# Patient Record
Sex: MALE | Race: WHITE | Employment: UNEMPLOYED | URBAN - METROPOLITAN AREA
[De-identification: names, ages, dates, MRNs, and addresses within clinical notes are randomized per-mention and may not be internally consistent; named-entity substitution may affect disease eponyms.]

---

## 2018-05-01 ENCOUNTER — HOSPITAL ENCOUNTER (OUTPATIENT)
Dept: NUTRITION | Age: 33
Discharge: HOME | End: 2018-05-01
Payer: MEDICARE

## 2018-05-01 PROCEDURE — 97802 MEDICAL NUTRITION INDIV IN: CPT

## 2018-05-01 NOTE — PROGRESS NOTES
[address] [place] [address] 51, 45 [facility] [place], 70 [facility]  Phone: [phone]  Fax: [phone]   Nutrition Assessment  Medical Nutrition Therapy   Outpatient Initial Evaluation         Patient Name: Tequila Medrano : 1985   Treatment Diagnosis: obesity   Referral Source: Kaylee Mclaughlin MD [facility]): 2018     Gender: male Age: 33 y.o. Ht: 62 In Wt: 193.6 lb  kg   BMI: 35.4 BMR   Male 26 BMR Female      Past Medical History includes: Hypothyroid; trisomy 24     Pertinent Medications:     Synthroid; Biochemical Data:   No results found for: HBA1C, HGBE8, KXG5GLPR, [id number]  No results found for: CHOL, CHOLPOCT, CHOLX, CHLST, CHOLV, HDL, HDLPOC, LDL, LDLCPOC, LDLC, DLDLP, VLDLC, VLDL, TGLX, TRIGL, TRIGP, TGLPOCT, CHHD, CHHDX  No results found for: GPT, ALT, SGOT, GGT, GGTP, AP, APIT, APX, CBIL, TBIL, TBILI  No results found for: TOYA, CREAPOC, ACREA, CREA, REFC3, REFC4  No results found for: BUN, BUNPOC, IBUN, MBUNV, BUNV  No results found for: MCACR, MCA1, MCA2, MCA3, MCAU, MCAU2, MCALPOCT     Subjective/Assessment:   Pt's care giver stated pt has gained 10 lbs over the past year and possibly 20 lbs over the past 4 years. She associates his most recent weight gain with extra snacking, especially at work, due to access to a vending machine. Pt works 9-3:30 x 5d/week. Pt's care giver reported pt's parents have limited and restricted pt's allowance each week, but pt has resorted to stealing money to purchase snacks. Pt lives in a group home and does not typically eat the meals prepared for the residents. Pt's care giver stated she takes him to the grocery store once per week. Current Eating Patterns: Pt is a very picky eater. He prefers high carbohydrate and high fat foods. Pt typically eats 3 meals per day, but snacks in between meals and before bed uncontrollably.   Pt refuses to eat most vegetables and his diet is limited in lean sources of protein. Pt drinks water or diet juice with meals, but will purchase regular soda from the vending machine at work. Estimate Needs   Calories:  1700 Protein: 128 Carbs: 170 Fat: 57   Kcal/day  g/day  g/day  g/day        percent: 30  40  30               Education & Recommendations provided:  Educated pt on macronutrient composition of various foods and provided specific recommendations for intake at each scheduled meal and snack. Also discussed the importance of establishing a consistent lifestyle and eating schedule to promote a more efficient metabolism. Handouts Provided: []  Carbohydrates  []  Protein  []  Fiber  []  Serving Sizes  [x]  Meal and Snack Ideas  []  Food Journals []  Diabetes  []  Cholesterol  []  Sodium  [x]  Meal Builder  []  Diet Plan  []  Others:   Information Reviewed with: Pt and pt's care giver, Dai Esparza   Readiness to Change Stage: [x]  Pre-contemplative    []  Contemplative  []  Preparation               []  Action                  []  Maintenance   Potential Barriers to Learning: []  Decline in memory    []  Language barrier   []  Other:  []  Emotional                  []  Limited mobility  [x]  Lack of motivation     [x] Vision, hearing or cognitive impairment   Expected Compliance: / Stacy Footman. .. [employer]ucer depending on the consistency of meal/snack planning and preparation on the part of his care givers. Nutritional Goal - To promote lifestyle changes to result in:    [x]  Weight loss  []  Improved diabetic control  []  Decreased cholesterol levels  []  Decreased blood pressure  []  Weight maintenance []  Preventing any interactions associated with food allergies  []  Adequate weight gain toward goal weight  []  Other:        Patient Goals:  SMART goals 1. Always combine and balance carbohydrate and protein        2. Eat 2-3 hrs after snacks and 4-5 hrs after meals  3.  Avoid eating after dinner; OR limit to protein choices only     Dietitian Signature: Niecy Saenz MS, RD, CSSD Date: 5/1/2018   Follow-up: Wed Jun 13 at 1:00 Time: 12:53 PM

## 2018-06-12 ENCOUNTER — HOSPITAL ENCOUNTER (OUTPATIENT)
Dept: NUTRITION | Age: 33
Discharge: HOME | End: 2018-06-12
Payer: MEDICARE

## 2018-06-12 PROCEDURE — 97803 MED NUTRITION INDIV SUBSEQ: CPT

## 2018-06-12 NOTE — PROGRESS NOTES
NUTRITION  FOLLOW-UP TREATMENT NOTE  Patient Name: Ximena Christensen         Date: 2018  : 1985    YES Patient  Verified  Diagnosis: obesity   In time:   100             Out time:   130   Total Treatment Time (min):   30     SUBJECTIVE/ASSESSMENT    Changes in medication or medical history? Any new allergies, surgeries or procedures?    /NO    If yes, update Summary List           Current Wt: 191.6 Previous Wt: 193.6 Wt Change: -2     Achievement of Goals: Pt's caregiver, Josie Gitelman, reported pt had lost 10 lbs after 1 month of following the recommended meal plan. Pt then stole money and purchased soda and junk food from the vending machine at work. Since then, pt has celebrated his roommate's birthday and participated in several other social functions involving food, and has regained about 8 lbs total.  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 Pt would like to swim at the [facility] for exercise, but it may be difficult for the staff to make this possible. Patient Education:  [x]  Review current plan with patient   []  Other:    Handouts/  Information Provided: []  Carbohydrates  []  Protein  []  Fiber  []  Serving Sizes  []  Fluids  []  General guidelines []  Diabetes  []  Cholesterol  []  Sodium  []  SBGM  []  Food Journals  []  Others:      New Patient Goals: 1. Limit total carbohydrate intake at meals to 40-50g  2.  Substitute a treat (dessert, salty snack, etc) for carb choice at that meal     PLAN    [x]  Continue on current plan []  Follow-up PRN   []  Discharge due to :    [x]  Next appt:  at 2:00     Dietitian: Micky Manzano MS, RD, CSSD    Date: 2018 Time: 2:55 PM